# Patient Record
Sex: MALE | Race: WHITE
[De-identification: names, ages, dates, MRNs, and addresses within clinical notes are randomized per-mention and may not be internally consistent; named-entity substitution may affect disease eponyms.]

---

## 2017-03-26 ENCOUNTER — HOSPITAL ENCOUNTER (INPATIENT)
Dept: HOSPITAL 74 - JER | Age: 27
LOS: 1 days | Discharge: LEFT BEFORE DISCHARGE | DRG: 816 | End: 2017-03-27
Attending: FAMILY MEDICINE | Admitting: FAMILY MEDICINE
Payer: COMMERCIAL

## 2017-03-26 VITALS — BODY MASS INDEX: 19.2 KG/M2

## 2017-03-26 DIAGNOSIS — J45.909: ICD-10-CM

## 2017-03-26 DIAGNOSIS — F17.210: ICD-10-CM

## 2017-03-26 DIAGNOSIS — T40.0X1A: Primary | ICD-10-CM

## 2017-03-26 DIAGNOSIS — T40.601A: ICD-10-CM
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
## 2017-03-26 DIAGNOSIS — F10.20: ICD-10-CM

## 2017-03-26 DIAGNOSIS — R41.82: ICD-10-CM

## 2017-03-26 LAB
ALBUMIN SERPL-MCNC: 4.2 G/DL (ref 3.4–5)
ALP SERPL-CCNC: 79 U/L (ref 45–117)
ALT SERPL-CCNC: 86 U/L (ref 12–78)
ANION GAP SERPL CALC-SCNC: 7 MMOL/L (ref 8–16)
APPEARANCE UR: CLEAR
ART PUNCT SITE: (no result)
ARTERIAL PATENCY WRIST A: POSITIVE
AST SERPL-CCNC: 59 U/L (ref 15–37)
BASE EXCESS BLDA CALC-SCNC: -1.6 MEQ/L (ref -2–2)
BASOPHILS # BLD: 0.7 % (ref 0–2)
BILIRUB SERPL-MCNC: 0.3 MG/DL (ref 0.2–1)
BILIRUB UR STRIP.AUTO-MCNC: NEGATIVE MG/DL
CALCIUM SERPL-MCNC: 8.6 MG/DL (ref 8.5–10.1)
CO2 SERPL-SCNC: 31 MMOL/L (ref 21–32)
COLOR UR: (no result)
CREAT SERPL-MCNC: 1 MG/DL (ref 0.7–1.3)
DEPRECATED RDW RBC AUTO: 13 % (ref 11.9–15.9)
EOSINOPHIL # BLD: 1.8 % (ref 0–4.5)
GLUCOSE SERPL-MCNC: 90 MG/DL (ref 74–106)
HCO3 BLDA-SCNC: 23.8 MEQ/L (ref 22–26)
KETONES UR QL STRIP: NEGATIVE
LEUKOCYTE ESTERASE UR QL STRIP.AUTO: NEGATIVE
LPM/O2%: (no result)
MCH RBC QN AUTO: 28.7 PG (ref 25.7–33.7)
MCHC RBC AUTO-ENTMCNC: 32.8 G/DL (ref 32–35.9)
MCV RBC: 87.3 FL (ref 80–96)
METHGB MFR BLD: 0.6 % (ref 0.4–1.5)
NEUTROPHILS # BLD: 52.8 % (ref 42.8–82.8)
NITRITE UR QL STRIP: NEGATIVE
PEEP SETTING VENT: 0 CMH2O
PH UR: 6 [PH] (ref 5–8)
PLATELET # BLD AUTO: 327 K/MM3 (ref 134–434)
PMV BLD: 8.1 FL (ref 7.5–11.1)
PO2 BLDA: 133 MMHG (ref 80–100)
PROT SERPL-MCNC: 7.5 G/DL (ref 6.4–8.2)
PROT UR QL STRIP: NEGATIVE
PROT UR QL STRIP: NEGATIVE
PT. ON O2?: YES
RBC # UR STRIP: NEGATIVE /UL
SAO2 % BLDA: 99 % (ref 90–98.9)
SP GR UR: 1.01 (ref 1–1.03)
TROPONIN I SERPL-MCNC: < 0.02 NG/ML (ref 0–0.05)
TYPE OF O2: (no result)
URINE MARIJUANA THC: NEGATIVE NG/ML
UROBILINOGEN UR STRIP-MCNC: NEGATIVE E.U./DL (ref 0.2–1)
WBC # BLD AUTO: 8.3 K/MM3 (ref 4–10)

## 2017-03-26 PROCEDURE — G0378 HOSPITAL OBSERVATION PER HR: HCPCS

## 2017-03-26 NOTE — PDOC
History of Present Illness





- General


History Source: Family


Exam Limitations: Other (obtunded and unable to give hx)





<AlexElgin - Last Filed: 03/26/17 18:46>





<Angelo De La Torre - Last Filed: 03/26/17 22:06>





- General


Stated Complaint: OVERDOSE





Past History





- Past Medical History


Asthma: Yes (AS A CHILD)


Suicide Attempt (Hx): No





- Immunization History


Immunization Up to Date: Yes





- Psycho/Social/Smoking Cessation Hx


Anxiety: No


Suicidal Ideation: No


Smoking Status: Yes


Smoking History: Current every day smoker


Have you smoked in the past 12 months: Yes


Number of Cigarettes Smoked Daily: 10


'Breaking Loose' booklet given: 01/18/16


Hx Alcohol Use: No


Drug/Substance Use Hx: No


Substance Use Type: None





<AlexElgin - Last Filed: 03/26/17 18:46>





<Angelo De La Torre - Last Filed: 03/26/17 22:06>





- Past Medical History


Allergies/Adverse Reactions: 


 Allergies











Allergy/AdvReac Type Severity Reaction Status Date / Time


 


venom-honey bee Allergy   Verified 03/26/17 17:26





[bee venom (honey bee)]     











Home Medications: 


Ambulatory Orders





NK [No Known Home Medication]  03/26/17 











**Review of Systems





- Review of Systems


Able to Perform ROS?: No (obtunded 2/2 OD)





<AlexChrissyAudrey - Last Filed: 03/26/17 18:46>





*Physical Exam





- Physical Exam


Respiratory/Chest: positive: Lungs Clear


Cardiovascular: positive: Regular Rate, S1, S2


Gastrointestinal/Abdominal: positive: Soft.  negative: Distended, Guarding


Integumentary: positive: Dry, Warm


Neurologic: positive: Other (obtunded)





<AlexElgin - Last Filed: 03/26/17 18:46>





- Vital Signs


 Last Vital Signs











Temp Pulse Resp BP Pulse Ox


 


 2 F L  82   17   102/60   98 


 


 03/26/17 20:55  03/26/17 20:55  03/26/17 20:55  03/26/17 20:55  03/26/17 20:55














<Angelo De La Torre - Last Filed: 03/26/17 22:06>





ED Treatment Course





- LABORATORY


CBC & Chemistry Diagram: 


 03/26/17 17:15





 03/26/17 17:15





<Elgin Johnson - Last Filed: 03/26/17 18:46>





- LABORATORY


CBC & Chemistry Diagram: 


 03/26/17 17:15





 03/26/17 17:15





- ADDITIONAL ORDERS


Additional order review: 


 Laboratory  Results











  03/26/17 03/26/17 03/26/17





  20:02 20:02 17:30


 


Puncture Site   Left radial 


 


ABG pH   7.34 L 


 


ABG pCO2 at Pt Temp   45.6 H 


 


ABG pO2 at Pt Temp   133.0 H 


 


ABG HCO3   23.8 


 


ABG O2 Sat (Measured)   99.0 H 


 


ABG O2 Content   16.2 


 


ABG Base Excess   -1.6 


 


Kiran Test   Positive 


 


Carboxyhemoglobin  4.5 H  


 


Methemoglobin  0.6  


 


O2 Delivery Device   Nasal 


 


Oxygen Flow Rate   3l 


 


PEEP   0.0 


 


Sodium   


 


Potassium   


 


Chloride   


 


Carbon Dioxide   


 


Anion Gap   


 


BUN   


 


Creatinine   


 


Creat Clearance w eGFR   


 


Random Glucose   


 


Calcium   


 


Total Bilirubin   


 


AST   


 


ALT   


 


Alkaline Phosphatase   


 


Creatine Kinase   


 


Creatine Kinase Index   


 


CK-MB (CK-2)   


 


CK-MB (CK-2) Rel Index   


 


Troponin I   


 


Total Protein   


 


Albumin   


 


Urine Color   


 


Urine Appearance   


 


Urine pH   


 


Ur Specific Gravity   


 


Urine Protein   


 


Urine Glucose (UA)   


 


Urine Ketones   


 


Urine Blood   


 


Urine Nitrite   


 


Urine Bilirubin   


 


Urine Urobilinogen   


 


Ur Leukocyte Esterase   


 


Salicylates   


 


Opiates Screen   


 


Methadone Screen   


 


Acetaminophen    < 2.0 L


 


Barbiturate Screen   


 


Phencyclidine Screen   


 


Ur Amphetamines Screen   


 


MDMA (Ecstasy) Screen   


 


Benzodiazepines Screen   


 


Cocaine Screen   


 


U Marijuana (THC) Screen   


 


Alcohol, Quantitative   














  03/26/17 03/26/17 03/26/17





  17:30 17:15 17:15


 


Puncture Site   


 


ABG pH   


 


ABG pCO2 at Pt Temp   


 


ABG pO2 at Pt Temp   


 


ABG HCO3   


 


ABG O2 Sat (Measured)   


 


ABG O2 Content   


 


ABG Base Excess   


 


Kiran Test   


 


Carboxyhemoglobin   


 


Methemoglobin   


 


O2 Delivery Device   


 


Oxygen Flow Rate   


 


PEEP   


 


Sodium   


 


Potassium   


 


Chloride   


 


Carbon Dioxide   


 


Anion Gap   


 


BUN   


 


Creatinine   


 


Creat Clearance w eGFR   


 


Random Glucose   


 


Calcium   


 


Total Bilirubin   


 


AST   


 


ALT   


 


Alkaline Phosphatase   


 


Creatine Kinase   


 


Creatine Kinase Index   


 


CK-MB (CK-2)   


 


CK-MB (CK-2) Rel Index   Cancelled 


 


Troponin I   


 


Total Protein   


 


Albumin   


 


Urine Color   


 


Urine Appearance   


 


Urine pH   


 


Ur Specific Gravity   


 


Urine Protein   


 


Urine Glucose (UA)   


 


Urine Ketones   


 


Urine Blood   


 


Urine Nitrite   


 


Urine Bilirubin   


 


Urine Urobilinogen   


 


Ur Leukocyte Esterase   


 


Salicylates  < 4.0  


 


Opiates Screen   


 


Methadone Screen   


 


Acetaminophen   


 


Barbiturate Screen   


 


Phencyclidine Screen   


 


Ur Amphetamines Screen   


 


MDMA (Ecstasy) Screen   


 


Benzodiazepines Screen   


 


Cocaine Screen   


 


U Marijuana (THC) Screen   


 


Alcohol, Quantitative    130.1 H*














  03/26/17 03/26/17 03/26/17





  17:15 17:15 17:15


 


Puncture Site   


 


ABG pH   


 


ABG pCO2 at Pt Temp   


 


ABG pO2 at Pt Temp   


 


ABG HCO3   


 


ABG O2 Sat (Measured)   


 


ABG O2 Content   


 


ABG Base Excess   


 


Kiran Test   


 


Carboxyhemoglobin   


 


Methemoglobin   


 


O2 Delivery Device   


 


Oxygen Flow Rate   


 


PEEP   


 


Sodium   145 


 


Potassium   4.4 


 


Chloride   107 


 


Carbon Dioxide   31 


 


Anion Gap   7 L 


 


BUN   8 


 


Creatinine   1.0  D 


 


Creat Clearance w eGFR   > 60 


 


Random Glucose   90 


 


Calcium   8.6 


 


Total Bilirubin   0.3  D 


 


AST   59 H D 


 


ALT   86 H D 


 


Alkaline Phosphatase   79  D 


 


Creatine Kinase   202 


 


Creatine Kinase Index   Y 


 


CK-MB (CK-2)   < 1.000 


 


CK-MB (CK-2) Rel Index   


 


Troponin I   < 0.02 


 


Total Protein   7.5 


 


Albumin   4.2 


 


Urine Color    Straw


 


Urine Appearance    Clear


 


Urine pH    6.0


 


Ur Specific Gravity    1.006


 


Urine Protein    Negative


 


Urine Glucose (UA)    Negative


 


Urine Ketones    Negative


 


Urine Blood    Negative


 


Urine Nitrite    Negative


 


Urine Bilirubin    Negative


 


Urine Urobilinogen    Negative


 


Ur Leukocyte Esterase    Negative


 


Salicylates   


 


Opiates Screen  Positive  


 


Methadone Screen  Negative  


 


Acetaminophen   


 


Barbiturate Screen  Negative  


 


Phencyclidine Screen  Negative  


 


Ur Amphetamines Screen  Negative  


 


MDMA (Ecstasy) Screen  Negative  


 


Benzodiazepines Screen  Positive  


 


Cocaine Screen  Negative  


 


U Marijuana (THC) Screen  Negative  


 


Alcohol, Quantitative   








 











  03/26/17





  17:15


 


RBC  4.99


 


MCV  87.3


 


MCHC  32.8


 


RDW  13.0


 


MPV  8.1


 


Neutrophils %  52.8


 


Lymphocytes %  35.1  D


 


Monocytes %  9.6


 


Eosinophils %  1.8


 


Basophils %  0.7














- Medications


Given in the ED: 


ED Medications














Discontinued Medications














Generic Name Dose Route Start Last Admin





  Trade Name Freq  PRN Reason Stop Dose Admin


 


Sodium Chloride  1,000 mls @ 1,000 mls/hr 03/26/17 17:08 03/26/17 17:16





  Normal Saline -  IV 03/26/17 18:07  1,000 mls/hr





  ASDIR STA   Administration


 


Sodium Chloride  1,000 mls @ 1,000 mls/hr 03/26/17 18:02 03/26/17 18:14





  Normal Saline -  IV 03/26/17 19:01  1,000 mls/hr





  ASDIR STA   Administration


 


Metoclopramide HCl  10 mg 03/26/17 18:02 03/26/17 18:15





  Reglan Injection -  IVPB 03/26/17 18:03  10 mg





  ONCE ONE   Administration


 


Ondansetron HCl  4 mg 03/26/17 17:08 03/26/17 17:17





  Zofran Injection  IVPUSH 03/26/17 17:09  4 mg





  ONCE ONE   Administration














<Angelo De La Torre - Last Filed: 03/26/17 22:06>





Medical Decision Making





- Medical Decision Making


03/26/17 17:10


27-year-old male, history of opiate abuse (percocet), status post 

rehabilitation a year ago and was on suboxone up until a week ago when patient 

self discontinued as is trying to "quit cold turkey" as per family, BIB EMS 

after father found pt obtunded and unresponsive with empty bottle of "percocet" 

on night stand.  Family also reports that patient has been drinking beer 

yesterday and today. No known psych hx otherwise and no h/o suicidal attempts








See exam





Opiod OD


Obtunded w/ mild hypoxia and miotic pupils on exam


-supplemental oxygen


-narcan


-IVF


-labs/drug screen


-anticipate admission











03/26/17 17:38


Pt now awake and more alert after narcan administration. Able to now give hx 

and admits that he took 3, 80mg oxycontin tabs and also drank alcohol today. 

Denies SI. Labs and utox pending. Will continue to reassess





03/26/17 18:09


Utox + for opiate and benzos. Pt admits that his father gave him 1/2 tab of 

clonazepam "to sleep" earlier today. Pt actively vomiting now. Reglan and IVF 

in progress. Plan is to admit if pt remains ill, otherwise possibly detox/rehab 

transfer to [facility] if pt consents





03/26/17 18:10








03/26/17 18:11








03/26/17 18:20








03/26/17 18:46








<Elgin Johnson - Last Filed: 03/26/17 18:46>





*DC/Admit/Observation/Transfer





<Elgin Johnson - Last Filed: 03/26/17 18:46>





- Discharge Dispostion


Admit: Yes





<Angelo De La Torre - Last Filed: 03/26/17 22:06>


Diagnosis at time of Disposition: 


Opiate overdose


Qualifiers:


 Encounter type: initial encounter Injury intent: accidental or unintentional 

Qualified Code(s): T40.601A - Poisoning by unspecified narcotics, accidental (

unintentional), initial encounter





Opioid dependence


Qualifiers:


 Substance use status: with intoxication Complication of substance-induced 

condition: with unspecified complication Qualified Code(s): F11.229 - Opioid 

dependence with intoxication, unspecified





- Discharge Dispostion


Condition at time of disposition: Unchanged/Unknown





Progress Note





- Progress Note


Progress Note: 


Spoke with Clementina at poison control center who recommended patient be 

admitted for observation due to 80mg Oxycontin ER (4 tabs) overdose, 

respiratory depression, mental status change, may need Narcan Q6hr prn, avoid 

Romazicon as this may cause rebound seizures, Q-T EKG prolongation. She also 

requested patient have ABG, with strict vitals Q4hrs. Patient family made 

aware. Dr. Herrera to admit patient.





<Angelo De La Torre - Last Filed: 03/26/17 22:06>

## 2017-03-27 VITALS — HEART RATE: 86 BPM | DIASTOLIC BLOOD PRESSURE: 66 MMHG | TEMPERATURE: 98.8 F | SYSTOLIC BLOOD PRESSURE: 112 MMHG

## 2017-03-27 LAB
ALBUMIN SERPL-MCNC: 3.8 G/DL (ref 3.4–5)
ALP SERPL-CCNC: 66 U/L (ref 45–117)
ALT SERPL-CCNC: 61 U/L (ref 12–78)
ANION GAP SERPL CALC-SCNC: 6 MMOL/L (ref 8–16)
AST SERPL-CCNC: 26 U/L (ref 15–37)
BASOPHILS # BLD: 0.6 % (ref 0–2)
BILIRUB SERPL-MCNC: 0.5 MG/DL (ref 0.2–1)
CALCIUM SERPL-MCNC: 8.7 MG/DL (ref 8.5–10.1)
CO2 SERPL-SCNC: 34 MMOL/L (ref 21–32)
CREAT SERPL-MCNC: 0.9 MG/DL (ref 0.7–1.3)
DEPRECATED RDW RBC AUTO: 13.1 % (ref 11.9–15.9)
EOSINOPHIL # BLD: 2.1 % (ref 0–4.5)
GLUCOSE SERPL-MCNC: 85 MG/DL (ref 74–106)
MCH RBC QN AUTO: 29.3 PG (ref 25.7–33.7)
MCHC RBC AUTO-ENTMCNC: 33.3 G/DL (ref 32–35.9)
MCV RBC: 88 FL (ref 80–96)
NEUTROPHILS # BLD: 53.5 % (ref 42.8–82.8)
PLATELET # BLD AUTO: 269 K/MM3 (ref 134–434)
PMV BLD: 7.9 FL (ref 7.5–11.1)
PROT SERPL-MCNC: 6.2 G/DL (ref 6.4–8.2)
TROPONIN I SERPL-MCNC: < 0.02 NG/ML (ref 0–0.05)
WBC # BLD AUTO: 8.9 K/MM3 (ref 4–10)

## 2017-03-27 NOTE — HP
Admitting History and Physical





- Admission


History of Present Illness: 


27-year-old male, history of opiate abuse (percocet), status post 

rehabilitation a year ago and was on suboxone up until a week ago when patient 

self discontinued as is trying to "quit cold turkey" as per family, BIB EMS 

after father found pt obtunded and unresponsive with empty bottle of "percocet" 

on night stand.  Family also reports that patient has been drinking beer 

yesterday and today. No known psych hx otherwise and no h/o suicidal attempts


THIS AM PT AWAKE BUT STILL GROGGY 


HE CANNOT GIVE MUCH DETAIL








- Past Medical History


Cardiovascular: No: CAD, CHF, HTN, Hyperlipdemia


Pulmonary: Yes: Asthma (AS A CHILD)


Gastrointestinal: No: Ascites


Musculoskeletal: No: Chronic low back pain


Dermatology: Yes: Other (shingles left eye age 12)


Additional Past Medical History: 


OXYCODONE AND ALCOHOL





- Smoking History


Smoking history: Current every day smoker


Have you smoked in the past 12 months: Yes


Aproximately how many cigarettes per day: 10





- Alcohol/Substance Use


Hx Alcohol Use: Yes


History of Substance Use: reports: Prescription





- Social History


ADL: Independent


Occupation: 


History of Recent Travel: No





Home Medications





- Allergies


Allergies/Adverse Reactions: 


 Allergies











Allergy/AdvReac Type Severity Reaction Status Date / Time


 


venom-honey bee Allergy   Verified 03/26/17 17:26





[bee venom (honey bee)]     














- Home Medications


Home Medications: 


Ambulatory Orders





NK [No Known Home Medication]  03/26/17 











Review of Systems





- Review of Systems


Constitutional: reports: Lethargy


Respiratory: reports: No Symptoms


Gastrointestinal: reports: No Symptoms


Genitourinary: reports: No Symptoms


Neurological: reports: Change in LOC, Weakness





Physical Examination


Vital Signs: 


 Vital Signs











Temperature  97.5 F L  03/26/17 23:30


 


Pulse Rate  87   03/27/17 05:56


 


Respiratory Rate  20   03/27/17 05:56


 


Blood Pressure  109/67   03/27/17 05:56


 


O2 Sat by Pulse Oximetry (%)  100   03/26/17 23:30











Cardiovascular: Yes: Tachycardia, S1, S2


Respiratory: Yes: Regular, CTA Bilaterally


Gastrointestinal: Yes: Normal Bowel Sounds, Soft


Edema: No


Neurological: Yes: Alert, Oriented, Lethargy, Weakness.  No: Facial Droop


Labs: 


 CBC, BMP





 03/27/17 07:50 











Problem List





- Problems


(1) Opiate overdose


Assessment/Plan: 


MONITOR ON TEL


DETOX CONSULT


Code(s): T40.601A - POISONING BY UNSP NARCOTICS, ACCIDENTAL, INIT   Qualifiers: 


     Encounter type: initial encounter     Injury intent: accidental or 

unintentional     Qualified Code(s): T40.601A - Poisoning by unspecified 

narcotics, accidental (unintentional), initial encounter  





(2) Change in mental status


Assessment/Plan: 


DUE TO ABOVE


OBSERVE


NEURO


Code(s): R41.82 - ALTERED MENTAL STATUS, UNSPECIFIED

## 2017-03-27 NOTE — CONSULT
Consult Detox [facility]


Reason for Current Admission/Consult: Alcohol dependence & opioid overdose


Referred by:: Mando Herrera MD





- History


History of Present Illness: 


Pt. had eloped then came back while I was in his room to see him.Nurse told pt. 

that he had to go back down to ED for evaluation before he could be re-

admitted. Pt. says he wants to be discharged.Unable to complete consultation.





- History Source


History Provided By: Patient





- Alcohol/Substance Use


Hx Alcohol Use: Yes





- Past Medical History


Cardio/Vascular: No: CAD, CHF, HTN, Hyperlipdemia


Pulmonary: Yes: Asthma (AS A CHILD)


Gastrointestinal: No: Ascites


Musculoskeletal: No: Chronic low back pain


Dermatology: Yes: Other (shingles left eye age 12)


Additional Medical History: PT. ADMITS TO MARIHUANA USE FROM 16 Y/O TO 18 Y/O. 

HE ALSO EXPERIMENTED WITH MDMA FROM [age] Y/O TO [age] Y/O.





- Significant


Medical Findings: 


 Laboratory Tests











  03/26/17 03/26/17 03/26/17





  17:15 17:15 17:15


 


WBC   8.3 


 


RBC   4.99 


 


Hgb   14.3 


 


Hct   43.6 


 


MCV   87.3 


 


MCHC   32.8 


 


RDW   13.0 


 


Plt Count   327 


 


MPV   8.1 


 


Neutrophils %   52.8 


 


Lymphocytes %   35.1  D 


 


Monocytes %   9.6 


 


Eosinophils %   1.8 


 


Basophils %   0.7 


 


Puncture Site   


 


ABG pH   


 


ABG pCO2 at Pt Temp   


 


ABG pO2 at Pt Temp   


 


ABG HCO3   


 


ABG O2 Sat (Measured)   


 


ABG O2 Content   


 


ABG Base Excess   


 


Kiran Test   


 


Carboxyhemoglobin   


 


Methemoglobin   


 


O2 Delivery Device   


 


Oxygen Flow Rate   


 


PEEP   


 


Sodium    145


 


Potassium    4.4


 


Chloride    107


 


Carbon Dioxide    31


 


Anion Gap    7 L


 


BUN    8


 


Creatinine    1.0  D


 


Creat Clearance w eGFR    > 60


 


Random Glucose    90


 


Calcium    8.6


 


Total Bilirubin    0.3  D


 


AST    59 H D


 


ALT    86 H D


 


Alkaline Phosphatase    79  D


 


Creatine Kinase    202


 


Creatine Kinase Index    Y


 


CK-MB (CK-2)    < 1.000


 


CK-MB (CK-2) Rel Index   


 


Troponin I    < 0.02


 


Total Protein    7.5


 


Albumin    4.2


 


Urine Color  Straw  


 


Urine Appearance  Clear  


 


Urine pH  6.0  


 


Ur Specific Gravity  1.006  


 


Urine Protein  Negative  


 


Urine Glucose (UA)  Negative  


 


Urine Ketones  Negative  


 


Urine Blood  Negative  


 


Urine Nitrite  Negative  


 


Urine Bilirubin  Negative  


 


Urine Urobilinogen  Negative  


 


Ur Leukocyte Esterase  Negative  


 


Salicylates   


 


Opiates Screen   


 


Methadone Screen   


 


Acetaminophen   


 


Barbiturate Screen   


 


Phencyclidine Screen   


 


Ur Amphetamines Screen   


 


MDMA (Ecstasy) Screen   


 


Benzodiazepines Screen   


 


Cocaine Screen   


 


U Marijuana (THC) Screen   


 


Alcohol, Quantitative   














  03/26/17 03/26/17 03/26/17





  17:15 17:15 17:15


 


WBC   


 


RBC   


 


Hgb   


 


Hct   


 


MCV   


 


MCHC   


 


RDW   


 


Plt Count   


 


MPV   


 


Neutrophils %   


 


Lymphocytes %   


 


Monocytes %   


 


Eosinophils %   


 


Basophils %   


 


Puncture Site   


 


ABG pH   


 


ABG pCO2 at Pt Temp   


 


ABG pO2 at Pt Temp   


 


ABG HCO3   


 


ABG O2 Sat (Measured)   


 


ABG O2 Content   


 


ABG Base Excess   


 


Kiran Test   


 


Carboxyhemoglobin   


 


Methemoglobin   


 


O2 Delivery Device   


 


Oxygen Flow Rate   


 


PEEP   


 


Sodium   


 


Potassium   


 


Chloride   


 


Carbon Dioxide   


 


Anion Gap   


 


BUN   


 


Creatinine   


 


Creat Clearance w eGFR   


 


Random Glucose   


 


Calcium   


 


Total Bilirubin   


 


AST   


 


ALT   


 


Alkaline Phosphatase   


 


Creatine Kinase   


 


Creatine Kinase Index   


 


CK-MB (CK-2)   


 


CK-MB (CK-2) Rel Index    Cancelled


 


Troponin I   


 


Total Protein   


 


Albumin   


 


Urine Color   


 


Urine Appearance   


 


Urine pH   


 


Ur Specific Gravity   


 


Urine Protein   


 


Urine Glucose (UA)   


 


Urine Ketones   


 


Urine Blood   


 


Urine Nitrite   


 


Urine Bilirubin   


 


Urine Urobilinogen   


 


Ur Leukocyte Esterase   


 


Salicylates   


 


Opiates Screen  Positive  


 


Methadone Screen  Negative  


 


Acetaminophen   


 


Barbiturate Screen  Negative  


 


Phencyclidine Screen  Negative  


 


Ur Amphetamines Screen  Negative  


 


MDMA (Ecstasy) Screen  Negative  


 


Benzodiazepines Screen  Positive  


 


Cocaine Screen  Negative  


 


U Marijuana (THC) Screen  Negative  


 


Alcohol, Quantitative   130.1 H* 














  03/26/17 03/26/17 03/26/17





  17:30 17:30 20:02


 


WBC   


 


RBC   


 


Hgb   


 


Hct   


 


MCV   


 


MCHC   


 


RDW   


 


Plt Count   


 


MPV   


 


Neutrophils %   


 


Lymphocytes %   


 


Monocytes %   


 


Eosinophils %   


 


Basophils %   


 


Puncture Site    Left radial


 


ABG pH    7.34 L


 


ABG pCO2 at Pt Temp    45.6 H


 


ABG pO2 at Pt Temp    133.0 H


 


ABG HCO3    23.8


 


ABG O2 Sat (Measured)    99.0 H


 


ABG O2 Content    16.2


 


ABG Base Excess    -1.6


 


Kiran Test    Positive


 


Carboxyhemoglobin   


 


Methemoglobin   


 


O2 Delivery Device    Nasal


 


Oxygen Flow Rate    3l


 


PEEP    0.0


 


Sodium   


 


Potassium   


 


Chloride   


 


Carbon Dioxide   


 


Anion Gap   


 


BUN   


 


Creatinine   


 


Creat Clearance w eGFR   


 


Random Glucose   


 


Calcium   


 


Total Bilirubin   


 


AST   


 


ALT   


 


Alkaline Phosphatase   


 


Creatine Kinase   


 


Creatine Kinase Index   


 


CK-MB (CK-2)   


 


CK-MB (CK-2) Rel Index   


 


Troponin I   


 


Total Protein   


 


Albumin   


 


Urine Color   


 


Urine Appearance   


 


Urine pH   


 


Ur Specific Gravity   


 


Urine Protein   


 


Urine Glucose (UA)   


 


Urine Ketones   


 


Urine Blood   


 


Urine Nitrite   


 


Urine Bilirubin   


 


Urine Urobilinogen   


 


Ur Leukocyte Esterase   


 


Salicylates  < 4.0  


 


Opiates Screen   


 


Methadone Screen   


 


Acetaminophen   < 2.0 L 


 


Barbiturate Screen   


 


Phencyclidine Screen   


 


Ur Amphetamines Screen   


 


MDMA (Ecstasy) Screen   


 


Benzodiazepines Screen   


 


Cocaine Screen   


 


U Marijuana (THC) Screen   


 


Alcohol, Quantitative   














  03/26/17 03/27/17 03/27/17





  20:02 07:50 07:50


 


WBC   8.9 


 


RBC   4.31 


 


Hgb   12.6  D 


 


Hct   37.9 


 


MCV   88.0 


 


MCHC   33.3 


 


RDW   13.1 


 


Plt Count   269 


 


MPV   7.9 


 


Neutrophils %   53.5 


 


Lymphocytes %   34.5 


 


Monocytes %   9.3 


 


Eosinophils %   2.1 


 


Basophils %   0.6 


 


Puncture Site   


 


ABG pH   


 


ABG pCO2 at Pt Temp   


 


ABG pO2 at Pt Temp   


 


ABG HCO3   


 


ABG O2 Sat (Measured)   


 


ABG O2 Content   


 


ABG Base Excess   


 


Kiran Test   


 


Carboxyhemoglobin  4.5 H  


 


Methemoglobin  0.6  


 


O2 Delivery Device   


 


Oxygen Flow Rate   


 


PEEP   


 


Sodium    142


 


Potassium    3.9


 


Chloride    102


 


Carbon Dioxide    34 H


 


Anion Gap    6 L


 


BUN    9


 


Creatinine    0.9


 


Creat Clearance w eGFR    > 60


 


Random Glucose    85


 


Calcium    8.7


 


Total Bilirubin    0.5  D


 


AST    26  D


 


ALT    61  D


 


Alkaline Phosphatase    66


 


Creatine Kinase    145


 


Creatine Kinase Index   


 


CK-MB (CK-2)   


 


CK-MB (CK-2) Rel Index   


 


Troponin I    < 0.02


 


Total Protein    6.2 L


 


Albumin    3.8


 


Urine Color   


 


Urine Appearance   


 


Urine pH   


 


Ur Specific Gravity   


 


Urine Protein   


 


Urine Glucose (UA)   


 


Urine Ketones   


 


Urine Blood   


 


Urine Nitrite   


 


Urine Bilirubin   


 


Urine Urobilinogen   


 


Ur Leukocyte Esterase   


 


Salicylates   


 


Opiates Screen   


 


Methadone Screen   


 


Acetaminophen   


 


Barbiturate Screen   


 


Phencyclidine Screen   


 


Ur Amphetamines Screen   


 


MDMA (Ecstasy) Screen   


 


Benzodiazepines Screen   


 


Cocaine Screen   


 


U Marijuana (THC) Screen   


 


Alcohol, Quantitative   








labs noted





Assessment Plan





- Diagnosis


(1) Opiate overdose


Status: Acute   Qualifiers: 


     Encounter type: initial encounter     Injury intent: accidental or 

unintentional     Qualified Code(s): T40.601A - Poisoning by unspecified 

narcotics, accidental (unintentional), initial encounter  








- Plan


Plan: 


To be F/U by primary care

## 2017-03-27 NOTE — EKG
Test Reason : 

Blood Pressure : ***/*** mmHG

Vent. Rate : 078 BPM     Atrial Rate : 078 BPM

   P-R Int : 140 ms          QRS Dur : 082 ms

    QT Int : 346 ms       P-R-T Axes : 070 083 057 degrees

   QTc Int : 394 ms

 

SINUS RHYTHM WITH MARKED SINUS ARRHYTHMIA

OTHERWISE NORMAL ECG

WHEN COMPARED WITH ECG OF 12 JAN 2002

NO SIGNIFICANT CHANGE WAS FOUND

Confirmed by ERI HARRISON MD ([id number]) on 3/27/2017 12:38:38 PM

 

Referred By:             Confirmed By:ERI HARRISON MD

## 2023-04-07 ENCOUNTER — HOSPITAL ENCOUNTER (EMERGENCY)
Dept: HOSPITAL 74 - JERFT | Age: 33
Discharge: HOME | End: 2023-04-07
Payer: COMMERCIAL

## 2023-04-07 VITALS — SYSTOLIC BLOOD PRESSURE: 126 MMHG | DIASTOLIC BLOOD PRESSURE: 70 MMHG | HEART RATE: 86 BPM

## 2023-04-07 VITALS — TEMPERATURE: 99 F | RESPIRATION RATE: 18 BRPM

## 2023-04-07 VITALS — BODY MASS INDEX: 19.2 KG/M2
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
## 2023-04-07 DIAGNOSIS — S63.104A: Primary | ICD-10-CM

## 2023-04-07 PROCEDURE — 0RSUXZZ REPOSITION RIGHT METACARPOPHALANGEAL JOINT, EXTERNAL APPROACH: ICD-10-PCS | Performed by: EMERGENCY MEDICINE
